# Patient Record
Sex: MALE | Race: BLACK OR AFRICAN AMERICAN
[De-identification: names, ages, dates, MRNs, and addresses within clinical notes are randomized per-mention and may not be internally consistent; named-entity substitution may affect disease eponyms.]

---

## 2019-12-13 NOTE — ED PSYCHOSOCIAL
General


Stated Complaint:  ANXIETY


Source:  patient, EMS


Exam Limitations:  no limitations





History of Present Illness


Date Seen by Provider:  Dec 13, 2019


Time Seen by Provider:  00:50


Initial Comments


Patient presents ER by EMS from the [facility] on [address]. He is complaining of 

a panic attack. He says is having these before although not for many years. When

he was in high school he said he took medications and depression. He says does 

not take any medicines now and has not follow-up with primary care doctor for 

many years. He does endorse some mild depression lately but denies any suicidal 

ideation. He says he is not looking for counseling. Says he was at home smoking 

a black and mild cigar when he started to feel his legs were weak his arms were 

shaky and his vision was changing.  He denies any recent alcohol use although he

does drink weekly typically liquor and wine.he denies any recreational drug use 

or other history of medical problems.





Allergies and Home Medications


Home Medications


Hydroxyzine Pamoate 25 Mg Capsule, 25 MG PO Q6H PRN for ANXIETY


   Prescribed by: SHU VEGAS on 12/13/19 0110





Patient Home Medication List


Home Medication List Reviewed:  Yes





Review of Systems


Constitutional:  No chills, No diaphoresis


EENTM:  No ear discharge, No ear pain


Respiratory:  No cough, No short of breath


Cardiovascular:  No chest pain, No edema


Gastrointestinal:  No abdominal pain, No constipation, No diarrhea


Genitourinary:  No discharge, No dysuria


Musculoskeletal:  No back pain, No joint pain


Skin:  No pruritus, No rash





Past Medical-Social-Family Hx


Patient Social History


Alcohol Use:  Occasionally Uses


Alcohol Beverage of Choice:  Wine, Cheap Liquor


Recreational Drug Use:  No


Smoking Status:  Current Everyday Smoker


Type Used:  Cigars


2nd Hand Smoke Exposure:  No


Recent Foreign Travel:  No


Contact w/Someone Who Travel:  No





Physical Exam





Vital Signs - First Documented








 12/13/19





 00:53


 


Temp 36.6


 


Pulse 75


 


Resp 18


 


B/P (MAP) 162/92 (115)


 


Pulse Ox 98





Capillary Refill :


Height, Weight, BMI


Height: '"


Weight: lbs. oz. kg;  BMI


Method:


General Appearance:  WD/WN, no apparent distress


HEENT:  PERRL/EOMI, pharynx normal


Neck:  full range of motion, normal inspection


Respiratory:  lungs clear, normal breath sounds, no respiratory distress, no 

accessory muscle use


Cardiovascular:  normal peripheral pulses, regular rate, rhythm


Peripheral Pulses:  2+ Radial Pulses (R), 2+ Radial Pulses (L)


Gastrointestinal:  non tender, soft


Neurologic/Psychiatric:  alert, normal mood/affect, oriented x 3


Appearance/Memory:  appropriate appearance, appropriate insight, neat, no memory

impairment


Behavior/Eye Contact:  cooperative, good eye contact, normal speech


Thoughts/Hallucinations:  normal thought pattern, no apparent hallucination


Skin:  warm/dry, other (round patches of hypo-pigmented skin anterior posterior 

trunk)





Progress/Results/Core Measures


Results/Orders


Vital Signs/I&O











 12/13/19





 00:53


 


Temp 36.6


 


Pulse 75


 


Resp 18


 


B/P (MAP) 162/92 (115)


 


Pulse Ox 98











Progress


Progress Note :  


   Time:  01:07


Progress Note


Had a long discussion about behavioral therapy techniques how to use mild 

sedative such as Vistaril. We have encouraged him to follow-up with [facility].





Departure


Impression





   Primary Impression:  


   Panic attack


   Additional Impression:  


   Tinea versicolor


Disposition:  01 HOME, SELF-CARE


Condition:  Stable





Departure-Patient Inst.


Decision time for Depature:  01:05


Patient Instructions:  Cognitive-Behavioral Therapy, Panic Disorder (DC)





Add. Discharge Instructions:  


At the first that you notice a panic attack starting remove yourself to a safe 

situation. If you are driving pullover.


Focused on your breathing and perform some cognitive behavioral techniques as we

discussed.


Take a tablet of Vistaril every 6 hours as needed. It is a mild sedative.


If you continue to have panic attacks then I would highly suggest you follow-up 

with a primary care doctor such as [facility].





For your skin you should  the cream that contains clotrimazole for 

athlete's foot or jock itch and apply it daily after bathing for 4 weeks. expect

good results in about 6 months.


Scripts


Hydroxyzine Pamoate (Vistaril) 25 Mg Capsule


25 MG PO Q6H PRN for ANXIETY, #10 CAP 0 Refills


   Prov: SHU VEGAS         12/13/19











SHU VEGAS                 Dec 13, 2019 01:11


POS

## 2021-05-22 ENCOUNTER — HOSPITAL ENCOUNTER (EMERGENCY)
Dept: HOSPITAL 75 - ER | Age: 21
Discharge: HOME | End: 2021-05-22
Payer: SELF-PAY

## 2021-05-22 VITALS — WEIGHT: 179.9 LBS | BODY MASS INDEX: 25.75 KG/M2 | HEIGHT: 70 IN

## 2021-05-22 VITALS — SYSTOLIC BLOOD PRESSURE: 131 MMHG | DIASTOLIC BLOOD PRESSURE: 72 MMHG

## 2021-05-22 DIAGNOSIS — Z87.891: ICD-10-CM

## 2021-05-22 DIAGNOSIS — J02.0: Primary | ICD-10-CM

## 2021-05-22 PROCEDURE — 87636 SARSCOV2 & INF A&B AMP PRB: CPT

## 2021-05-22 PROCEDURE — 87430 STREP A AG IA: CPT

## 2021-05-22 PROCEDURE — 87804 INFLUENZA ASSAY W/OPTIC: CPT

## 2021-05-22 NOTE — ED EENT
History of Present Illness


General


Chief Complaint:  Cough/Cold/Flu Symptoms


Stated Complaint:  SORE THROAT/HEADACHE/COUGH


Nursing Triage Note:  


PT AMBULATE TO ROOM 09 WITH C/O HEADACHE AND SORE THROAT SINCE THURSDAY.


Source:  patient


Exam Limitations:  no limitations





History of Present Illness


Date Seen by Provider:  May 22, 2021


Time Seen by Provider:  21:08


Initial Comments


This is a well-appearing 20-year-old male who presents to the ER with complaints

of sore throat x2 days.  States that the pain has been increasing and making it 

difficult to swallow. Has not taken anything for the pain prior to arrival.  No 

ill contacts.  No Covid exposures. Denies fever, shortness of breath, nausea, 

vomiting, diarrhea.





Allergies and Home Medications


Allergies


Coded Allergies:  


     No Known Drug Allergies (Unverified , 5/22/21)





Home Medications


Amoxicillin/Potassium Clav 1 Each Tablet, 1 EACH PO BID


   Prescribed by: NATAN OLIVEIRA on 5/22/21 2209


Hydroxyzine Pamoate 25 Mg Capsule, 25 MG PO Q6H PRN for ANXIETY


   Prescribed by: SHU VEGAS on 12/13/19 0110





Patient Home Medication List


Home Medication List Reviewed:  Yes





Review of Systems


Review of Systems


Constitutional:  chills; No dizziness


Eyes:  No Symptoms Reported


Ears:  No Symptoms Reported


Nose:  no symptoms reported


Mouth:  no symptoms reported


Throat:  pain, painful swallowing


Respiratory:  cough; No phlegm, No short of breath, No wheezing


Cardiovascular:  no symptoms reported


Gastrointestinal:  no symptoms reported


Musculoskeletal:  no symptoms reported


Skin:  no symptoms reported


Neurological:  No Symptoms Reported


Hematologic/Lymphatic:  No Symptoms Reported


Immunological/Allergic:  no symptoms reported





Past Medical-Social-Family Hx


Patient Social History


Alcohol Use:  Occasionally Uses


Number of Drinks Today:  CC


Alcohol Beverage of Choice:  Wine, Cheap Liquor


Smoking Status:  Former Smoker


Type Used:  Cigars, Electronic/Vapor


2nd Hand Smoke Exposure:  No


Recent Infectious Disease Expo:  No


Recent Hopitalizations:  No





Seasonal Allergies


Seasonal Allergies:  Yes





Past Medical History


Surgeries:  No


Respiratory:  No


Cardiac:  No


Neurological:  No


Genitourinary:  No


Gastrointestinal:  No


Musculoskeletal:  No


Endocrine:  No


HEENT:  No


Cancer:  No


Psychosocial:  Yes


ADD/ADHD, Depression


Blood Disorders:  No





Physical Exam


Vital Signs





Vital Signs - First Documented








 5/22/21 5/22/21





 21:04 22:25


 


Temp 38.2 


 


Pulse 83 


 


Resp 18 


 


B/P (MAP) 130/74 (92) 


 


Pulse Ox  99


 


O2 Delivery Room Air 








Height, Weight, BMI


Height: '"


Weight: lbs. oz. kg; 25.00 BMI


Method:


General Appearance:  WD/WN, no apparent distress


Eyes:  bilateral eye normal inspection, bilateral eye PERRL, bilateral eye EOMI


Ears:  bilateral ear auricle normal, bilateral ear canal normal, bilateral ear 

TM normal


Nose:  normal inspection; No discharge


Mouth/Throat:  No excessive drooling; pharynx tenderness; No tonsillar exudate; 

tonsillar swelling (2+); No trismus; uvula swelling (no uvula deviation ); No 

voice changes


Neck:  full range of motion, supple, normal inspection, lymphadenopathy (R) 

(tonsillar ), lymphadenopathy (L) (tonsillar )


Cardiovascular:  regular rate, rhythm, no murmur


Respiratory:  lungs clear, normal breath sounds, no respiratory distress, no 

accessory muscle use


Gastrointestinal:  normal bowel sounds, non tender, soft


Neurologic/Psychiatric:  no motor/sensory deficits, alert, normal mood/affect, 

oriented x 3


Skin:  normal color, warm/dry





Progress/Results/Core Measures


Results/Orders


Lab Results





Laboratory Tests








Test


 5/22/21


21:10 Range/Units


 


 


SARS-CoV-2 RNA (RT-PCR) Not Detected  Not Detecte  


 


Group A Streptococcus Screen NEGATIVE  NEGATIVE  








Micro Results





Microbiology


5/22/21 Throat Culture - Preliminary, Resulted


          No Beta Strep isolated


5/22/21 Influenza Types A,B Antigen (MAGALY) - Final, Complete


          





My Orders





Orders - NATAN OLIVEIRA APRN


Covid 19 Inhouse Test (5/22/21 21:18)


Influenza A And B Antigens (5/22/21 21:18)


Rapid Strep A Screen (5/22/21 21:18)


Ketorolac Injection (Toradol Injection) (5/22/21 21:30)


Methylprednisolone Acetate Inj (Depo-Med (5/22/21 22:00)


Dexamethasone Injection (Decadron Inje (5/22/21 22:00)


Amoxicillin/Clavulanate Tablet (Augmenti (5/22/21 22:00)





Medications Given in ED





Current Medications








 Medications  Dose


 Ordered  Sig/Piotr


 Route  Start Time


 Stop Time Status Last Admin


Dose Admin


 


 Amoxicillin/


 Clavulanate


 Potassium  875 mg  ONCE  ONCE


 PO  5/22/21 22:00


 5/22/21 22:01 DC 5/22/21 22:07


875 MG


 


 Dexamethasone


 Sodium Phosphate  4 mg  ONCE  ONCE


 IM  5/22/21 22:00


 5/22/21 22:01 DC 5/22/21 22:07


4 MG


 


 Ketorolac


 Tromethamine  60 mg  ONCE  ONCE


 IM  5/22/21 21:30


 5/22/21 21:31 DC 5/22/21 21:35


60 MG


 


 Methylprednisolone


 Acetate  80 mg  ONCE  ONCE


 IM  5/22/21 22:00


 5/22/21 22:01 DC 5/22/21 22:07


80 MG








Vital Signs/I&O











 5/22/21 5/22/21 5/22/21





 21:04 21:12 22:25


 


Temp 38.2  


 


Pulse 83  59


 


Resp 18  18


 


B/P (MAP) 130/74 (92)  131/72 (92)


 


Pulse Ox   99


 


O2 Delivery Room Air Room Air Room Air














Blood Pressure Mean:                    92











Progress


Progress Note :  


Progress Note


Given Toradol 60mg IM for pain, reported improvement of pain at time of 

discharge. Given steroid injection and Augmentin prior to dismissal. To follow 

up with [facility] if symptoms are not improved by Tuesday. Reviewed 

discharge POC and he is agreeable with plan.





Departure


Impression





   Primary Impression:  


   Strep throat


Disposition:  01 HOME, SELF-CARE


Condition:  Improved





Departure-Patient Inst.


Decision time for Depature:  21:59


Referrals:  


NO,LOCAL PHYSICIAN (PCP/Family)


Primary Care Physician


Patient Instructions:  Strep Throat (DC)





Add. Discharge Instructions:  


Plan: 


1. May use Tylenol 650mg every 4-6 hours or Ibuprofen 600mg every 6 hours as 

needed for pain. 


2. Use warm salt water gargles 3-4x a day for pain. 


3. Take antibiotics as directed and complete full course even if you begin to 

feel better. 


4. Follow up with [facility] if your symptoms persist by Tuesday. 


5. Return to ER if you experience any new, concerning, or worsening symptoms. 





All discharge instructions reviewed with patient and/or family. Voiced 

understanding.


Scripts


Amoxicillin/Potassium Clav (Augmentin 875-125 Tablet) 1 Each Tablet


1 EACH PO BID for 7 Days, #14 TAB 0 Refills


   Prov: NATAN OLIVEIRA APRN         5/22/21











NATAN OLIVEIRA APRN           May 22, 2021 21:21